# Patient Record
Sex: MALE | Race: WHITE | URBAN - METROPOLITAN AREA
[De-identification: names, ages, dates, MRNs, and addresses within clinical notes are randomized per-mention and may not be internally consistent; named-entity substitution may affect disease eponyms.]

---

## 2021-05-12 ENCOUNTER — OFFICE VISIT (OUTPATIENT)
Dept: PODIATRY | Facility: CLINIC | Age: 46
End: 2021-05-12
Payer: COMMERCIAL

## 2021-05-12 VITALS — HEART RATE: 72 BPM | DIASTOLIC BLOOD PRESSURE: 85 MMHG | WEIGHT: 226 LBS | SYSTOLIC BLOOD PRESSURE: 126 MMHG

## 2021-05-12 DIAGNOSIS — B07.9 VERRUCA: Primary | ICD-10-CM

## 2021-05-12 PROCEDURE — 99243 OFF/OP CNSLTJ NEW/EST LOW 30: CPT | Performed by: PODIATRIST

## 2021-05-12 PROCEDURE — 17110 DESTRUCTION B9 LES UP TO 14: CPT | Performed by: PODIATRIST

## 2021-05-12 RX ORDER — BENAZEPRIL HYDROCHLORIDE AND HYDROCHLOROTHIAZIDE 20; 12.5 MG/1; MG/1
1 TABLET ORAL DAILY
COMMUNITY
Start: 2020-12-01

## 2021-05-12 RX ORDER — ATORVASTATIN CALCIUM 20 MG/1
10 TABLET, FILM COATED ORAL DAILY
COMMUNITY
Start: 2021-01-19

## 2021-05-12 NOTE — PATIENT INSTRUCTIONS
Instructions for Patients Undergoing Catherone Therapy for Verruca (Warts)    It is normal for acid therapy to cause the surrounding skin to get soft and white  It is normal for acid therapy to cause some burning and tenderness after each application  The final phase of the acid therapy is to cause a painful blister  Sometimes this happens after only one application of the Cantherone but more often after 2-4 applications  This is normal   The skin will be whitish and the wart lesion will be swollen and tender and sometime have some drainage  Typically when this happens the wart will be gone  It is not necessary to go the ER unless there is redness all around the lesion or you have a fever  It's not typically necessary to have a culture or start antibiotics  You can stop applying the acid at that point and you make soak the foot in Epsom salts and keep covered with a band-aid in between soaks  You doctor will open the blister at the next visit and the pain will resolve and usually the wart will be noted to be gone

## 2021-05-12 NOTE — PROGRESS NOTES
This patient was seen on 5/12/21  My role is Foot , Ankle, and Wound Specialist    SUBJECTIVE    Chief Complaint:  Painful lesion Left foot     Patient ID: Marty Isbell is a 54 y o  male  Sherry Reese is here with his wife for the first time with a cc of a painful lesion plantar Left foot  He noted the "callous" several weeks ago and attempted to "cut it off" at home  He developed progressive pain, swelling and went to urgent care  He was given Keflex and took it and the pain mostly resolved  Now he notes tenderness and a main lesion plantar Left foot with a small satellite lesion x 1 Left and x 2 Right  He has a teenage daughter who is currently being treated at our practice for warts  The following portions of the patient's history were reviewed and updated as appropriate: allergies, current medications, past family history, past medical history, past social history, past surgical history and problem list     Review of Systems   Constitutional: Negative  Respiratory: Negative  Cardiovascular: Negative  Gastrointestinal: Negative  Musculoskeletal: Positive for gait problem  Skin: Positive for color change  OBJECTIVE      /85   Pulse 72   Wt 103 kg (226 lb)     Foot/Ankle Musculoskeletal Exam    General      Neurological: alert    Neurovascular      Neurovascular - Right        Dorsalis pedis: 2+      Posterior tibial: 2+      Neurovascular - Left        Dorsalis pedis: 2+      Posterior tibial: 2+       Physical Exam  Vitals signs and nursing note reviewed  Constitutional:       General: He is not in acute distress  Appearance: Normal appearance  He is not ill-appearing, toxic-appearing or diaphoretic  Cardiovascular:      Rate and Rhythm: Normal rate  Pulses: Normal pulses  Dorsalis pedis pulses are 2+ on the right side and 2+ on the left side  Posterior tibial pulses are 2+ on the right side and 2+ on the left side     Pulmonary:      Effort: Pulmonary effort is normal    Feet:      Right foot:      Protective Sensation: 10 sites tested  10 sites sensed  Left foot:      Protective Sensation: 10 sites tested  10 sites sensed  Skin:     Comments: I note a 1 0cm lesion plantar Left foot with pinpoint hemorrages and the typical "cauliflower" appearance of verruca  I note a pinhead sized satellite lesion x 1 Left and x 2 Right  Neurological:      Mental Status: He is alert and oriented to person, place, and time  Psychiatric:         Behavior: Behavior normal              ASSESSMENT     Diagnoses and all orders for this visit:    Verruca    Other orders  -     atorvastatin (LIPITOR) 20 mg tablet; Take 10 mg by mouth daily  -     benazepril-hydrochlorthiazide (LOTENSIN HCT) 20-12 5 MG per tablet; Take 1 tablet by mouth daily         Problem List Items Addressed This Visit        Other    Verruca - Primary            Problems:  Plantar verruca    PLAN    These are verruca  We discussed treatment options for the verruca including benign neglect which includes risk of continued spread, OTC salacyclic acid or cryotherapy treatment with low expectation of cure, office-based acid therapy using cantherone with high expectation of cure but requiring 4-5 visits, candida injection which is typically reserved for only recalcitrant cases,  office-based surgery with CO2 laser or scalpel with high expectation of cure but increased pain and risk of scarring, and home remedies including "duct tape" or cimetidine therapy which are not formally recognized therapy and may or may not have benefit  He wants to proceed with cantherone therapy  A formal timeout including patient identification, laterality and existing allergies using [facility] protocol was conducted  The warts were pared with a scalpel down to pinpoint bleeding to remove all keratotic debris  Bleeding was controlled with light pressure   Canterone (1 drop) was applied with a sterile applicator and covered with a small sterile adhesive bandage to each lesion  Instructions were given to leave the dressings intact for 12 hours  After that, the bandages may be removed followed by normal bathing and redressing of the sites with an OTC salacyclic acid patch of choice  I was very clear that it needs to be constantly treated with the sal acid patch until the next visit  Lesion Destruction    Date/Time: 5/12/2021 3:07 PM  Performed by: Shani Gonzalez DPM  Authorized by: Shani Gonzalez DPM   Universal Protocol:  Consent: Verbal consent obtained  Risks and benefits: risks, benefits and alternatives were discussed  Consent given by: patient  Time out: Immediately prior to procedure a "time out" was called to verify the correct patient, procedure, equipment, support staff and site/side marked as required  Timeout called at: 5/12/2021 3:07 PM   Patient understanding: patient states understanding of the procedure being performed  Patient identity confirmed: verbally with patient      Procedure Details - Lesion Destruction:     Number of Lesions:  1  Lesion 1:     Body area:  Lower extremity    Lower extremity location:  L foot    Malignancy: benign lesion      Destruction method: chemical removal          I also discussed that maceration and whiteness of the surrounding intact skin and also possible tenderness / blistering are typical normal findings of the treatment

## 2021-05-12 NOTE — LETTER
May 13, 2021     Sharon Garcia [place] [id number]    Patient: Gilmer Corrigan   YOB: 1975   Date of Visit: 5/12/2021       Dear Dr Murray Floor: Thank you for referring Gilmer Corrigan to me for evaluation  Below are my notes for this consultation  If you have questions, please do not hesitate to call me  I look forward to following your patient along with you  Sincerely,        Emani Lombardi DPM        CC: No Recipients  Jaon Ibanez  5/12/2021  3:08 PM  Signed  This patient was seen on 5/12/21  My role is Foot , Ankle, and Wound Specialist    SUBJECTIVE    Chief Complaint:  Painful lesion Left foot     Patient ID: Gilmer Corrigan is a 54 y o  male  [facility] is here with his wife for the first time with a cc of a painful lesion plantar Left foot  He noted the "callous" several weeks ago and attempted to "cut it off" at home  He developed progressive pain, swelling and went to urgent care  He was given Keflex and took it and the pain mostly resolved  Now he notes tenderness and a main lesion plantar Left foot with a small satellite lesion x 1 Left and x 2 Right  He has a teenage daughter who is currently being treated at our practice for warts  The following portions of the patient's history were reviewed and updated as appropriate: allergies, current medications, past family history, past medical history, past social history, past surgical history and problem list     Review of Systems   Constitutional: Negative  Respiratory: Negative  Cardiovascular: Negative  Gastrointestinal: Negative  Musculoskeletal: Positive for gait problem  Skin: Positive for color change           OBJECTIVE      /85   Pulse 72   Wt 103 kg (226 lb)     Foot/Ankle Musculoskeletal Exam    General      Neurological: alert    Neurovascular      Neurovascular - Right        Dorsalis pedis: 2+      Posterior tibial: 2+      Neurovascular - Left        Dorsalis pedis: 2+      Posterior tibial: 2+       Physical Exam  Vitals signs and nursing note reviewed  Constitutional:       General: He is not in acute distress  Appearance: Normal appearance  He is not ill-appearing, toxic-appearing or diaphoretic  Cardiovascular:      Rate and Rhythm: Normal rate  Pulses: Normal pulses  Dorsalis pedis pulses are 2+ on the right side and 2+ on the left side  Posterior tibial pulses are 2+ on the right side and 2+ on the left side  Pulmonary:      Effort: Pulmonary effort is normal    Feet:      Right foot:      Protective Sensation: 10 sites tested  10 sites sensed  Left foot:      Protective Sensation: 10 sites tested  10 sites sensed  Skin:     Comments: I note a 1 0cm lesion plantar Left foot with pinpoint hemorrages and the typical "cauliflower" appearance of verruca  I note a pinhead sized satellite lesion x 1 Left and x 2 Right  Neurological:      Mental Status: He is alert and oriented to person, place, and time  Psychiatric:         Behavior: Behavior normal              ASSESSMENT     Diagnoses and all orders for this visit:    Verruca    Other orders  -     atorvastatin (LIPITOR) 20 mg tablet; Take 10 mg by mouth daily  -     benazepril-hydrochlorthiazide (LOTENSIN HCT) 20-12 5 MG per tablet; Take 1 tablet by mouth daily         Problem List Items Addressed This Visit        Other    Verruca - Primary            Problems:  Plantar verruca    PLAN    These are verruca      We discussed treatment options for the verruca including benign neglect which includes risk of continued spread, OTC salacyclic acid or cryotherapy treatment with low expectation of cure, office-based acid therapy using cantherone with high expectation of cure but requiring 4-5 visits, candida injection which is typically reserved for only recalcitrant cases,  office-based surgery with CO2 laser or scalpel with high expectation of cure but increased pain and risk of scarring, and home remedies including "duct tape" or cimetidine therapy which are not formally recognized therapy and may or may not have benefit  He wants to proceed with cantherone therapy  A formal timeout including patient identification, laterality and existing allergies using [facility] protocol was conducted  The warts were pared with a scalpel down to pinpoint bleeding to remove all keratotic debris  Bleeding was controlled with light pressure  Canterone (1 drop) was applied with a sterile applicator and covered with a small sterile adhesive bandage to each lesion  Instructions were given to leave the dressings intact for 12 hours  After that, the bandages may be removed followed by normal bathing and redressing of the sites with an OTC salacyclic acid patch of choice  I was very clear that it needs to be constantly treated with the sal acid patch until the next visit  Lesion Destruction    Date/Time: 5/12/2021 3:07 PM  Performed by: Rodriguez Rodriguez DPM  Authorized by: Rodriguez Rodriguez DPM   Universal Protocol:  Consent: Verbal consent obtained  Risks and benefits: risks, benefits and alternatives were discussed  Consent given by: patient  Time out: Immediately prior to procedure a "time out" was called to verify the correct patient, procedure, equipment, support staff and site/side marked as required  Timeout called at: 5/12/2021 3:07 PM   Patient understanding: patient states understanding of the procedure being performed  Patient identity confirmed: verbally with patient      Procedure Details - Lesion Destruction:     Number of Lesions:  1  Lesion 1:     Body area:  Lower extremity    Lower extremity location:  L foot    Malignancy: benign lesion      Destruction method: chemical removal          I also discussed that maceration and whiteness of the surrounding intact skin and also possible tenderness / blistering are typical normal findings of the treatment

## 2021-05-19 ENCOUNTER — OFFICE VISIT (OUTPATIENT)
Dept: PODIATRY | Facility: CLINIC | Age: 46
End: 2021-05-19
Payer: COMMERCIAL

## 2021-05-19 VITALS
SYSTOLIC BLOOD PRESSURE: 131 MMHG | HEIGHT: 72 IN | DIASTOLIC BLOOD PRESSURE: 92 MMHG | BODY MASS INDEX: 30.1 KG/M2 | WEIGHT: 222.2 LBS | HEART RATE: 80 BPM

## 2021-05-19 DIAGNOSIS — B07.9 VERRUCA: Primary | ICD-10-CM

## 2021-05-19 PROCEDURE — 17110 DESTRUCTION B9 LES UP TO 14: CPT | Performed by: PODIATRIST

## 2021-05-19 PROCEDURE — 99213 OFFICE O/P EST LOW 20 MIN: CPT | Performed by: PODIATRIST

## 2021-05-20 NOTE — PROGRESS NOTES
Date Patient Seen: 5/19/21       Subjective:     Chief Complaint:  Verruca     Patient ID: Donovan Hernandez is a 54 y o  male  Curt Ricketts is here after cantherone therapy for verruca  He was somewhat non-compliant with home application of salacyclic acids to his lesions  He did note quite a bit of pain on the Left foot after the cantherone treatment  The following portions of the patient's history were reviewed and updated as appropriate: allergies, current medications, past family history, past medical history, past social history, past surgical history and problem list     Review of Systems   Constitutional: Negative  Respiratory: Negative  Cardiovascular: Negative  Gastrointestinal: Negative  Musculoskeletal: Positive for gait problem  Skin: Positive for color change  Objective:      /92   Pulse 80   Ht 6' (1 829 m) Comment: verbal  Wt 101 kg (222 lb 3 2 oz)   BMI 30 14 kg/m²        Physical Exam  Vitals signs and nursing note reviewed  Constitutional:       General: He is not in acute distress  Appearance: Normal appearance  He is not ill-appearing, toxic-appearing or diaphoretic  Cardiovascular:      Rate and Rhythm: Normal rate  Pulses: Normal pulses  Dorsalis pedis pulses are 2+ on the right side and 2+ on the left side  Posterior tibial pulses are 2+ on the right side and 2+ on the left side  Pulmonary:      Effort: Pulmonary effort is normal    Feet:      Right foot:      Protective Sensation: 10 sites tested  10 sites sensed  Left foot:      Protective Sensation: 10 sites tested  10 sites sensed  Skin:     Comments: I note a 1 0cm lesion plantar Left foot with pinpoint hemorrages and the typical "cauliflower" appearance of verruca  I note a pinhead sized satellite lesion x 1 Left and x 2 Right  The Left foot lesion has some blistering around it typical of cantherone treatment     Neurological:      Mental Status: He is alert and oriented to person, place, and time  Psychiatric:         Behavior: Behavior normal             Diagnoses and all orders for this visit:    Verruca         Assessment:  Verruca    Plan:  I pared down the macerated wart tissue with scalpel  About 80% of it is resolved  I feel that reapplication of cantherone will be too painful  I recommended he continue daily application of salacyclic OTC to all 4 lesions x 14 days and return here for re-evaluation in 30 days  Lesion Destruction    Date/Time: 5/19/2021 8:12 AM  Performed by: Matt Brooke DPM  Authorized by: Matt Brooke DPM   Universal Protocol:  Consent: Verbal consent obtained  Risks and benefits: risks, benefits and alternatives were discussed  Consent given by: patient  Time out: Immediately prior to procedure a "time out" was called to verify the correct patient, procedure, equipment, support staff and site/side marked as required    Timeout called at: 5/19/2021 8:12 AM   Patient understanding: patient states understanding of the procedure being performed  Patient identity confirmed: verbally with patient      Procedure Details - Lesion Destruction:     Number of Lesions:  4  Lesion 1:     Body area:  Lower extremity    Lower extremity location:  L foot    Malignancy: benign lesion      Destruction method: chemical removal    Lesion 2:     Body area:  Lower extremity    Lower extremity location:  L foot    Malignancy: benign lesion      Destruction method: chemical removal    Lesion 3:     Body area:  Lower extremity    Lower extremity location:  R foot    Destruction method: chemical removal    Lesion 4:     Body area:  Lower extremity    Lower extremity location:  R foot    Malignancy: benign lesion      Destruction method: chemical removal

## 2023-01-21 ENCOUNTER — HOSPITAL ENCOUNTER (EMERGENCY)
Facility: HOSPITAL | Age: 48
Discharge: HOME | End: 2023-01-21
Attending: EMERGENCY MEDICINE

## 2023-01-21 VITALS
DIASTOLIC BLOOD PRESSURE: 99 MMHG | SYSTOLIC BLOOD PRESSURE: 145 MMHG | RESPIRATION RATE: 14 BRPM | TEMPERATURE: 98.9 F | HEART RATE: 68 BPM | OXYGEN SATURATION: 98 %

## 2023-01-21 DIAGNOSIS — R10.11 RIGHT UPPER QUADRANT ABDOMINAL PAIN: Primary | ICD-10-CM

## 2023-01-21 DIAGNOSIS — R74.01 ELEVATED ALT MEASUREMENT: ICD-10-CM

## 2023-01-21 DIAGNOSIS — Z87.19 HISTORY OF DIVERTICULITIS: ICD-10-CM

## 2023-01-21 LAB
ALBUMIN SERPL BCP-MCNC: 4.1 G/DL (ref 3.5–5)
ALP SERPL-CCNC: 73 U/L (ref 46–116)
ALT SERPL W P-5'-P-CCNC: 109 U/L (ref 12–78)
ANION GAP SERPL CALCULATED.3IONS-SCNC: 5 MMOL/L (ref 4–13)
AST SERPL W P-5'-P-CCNC: 43 U/L (ref 5–45)
ATRIAL RATE: 66 BPM
BASOPHILS # BLD AUTO: 0.04 THOUSANDS/ÂΜL (ref 0–0.1)
BASOPHILS NFR BLD AUTO: 1 % (ref 0–1)
BILIRUB SERPL-MCNC: 0.38 MG/DL (ref 0.2–1)
BUN SERPL-MCNC: 17 MG/DL (ref 5–25)
CALCIUM SERPL-MCNC: 9.3 MG/DL (ref 8.3–10.1)
CHLORIDE SERPL-SCNC: 107 MMOL/L (ref 96–108)
CO2 SERPL-SCNC: 27 MMOL/L (ref 21–32)
CREAT SERPL-MCNC: 0.91 MG/DL (ref 0.6–1.3)
EOSINOPHIL # BLD AUTO: 0.09 THOUSAND/ÂΜL (ref 0–0.61)
EOSINOPHIL NFR BLD AUTO: 1 % (ref 0–6)
ERYTHROCYTE [DISTWIDTH] IN BLOOD BY AUTOMATED COUNT: 11.9 % (ref 11.6–15.1)
GFR SERPL CREATININE-BSD FRML MDRD: 99 ML/MIN/1.73SQ M
GLUCOSE SERPL-MCNC: 97 MG/DL (ref 65–140)
HCT VFR BLD AUTO: 43.1 % (ref 36.5–49.3)
HGB BLD-MCNC: 14.5 G/DL (ref 12–17)
IMM GRANULOCYTES # BLD AUTO: 0.05 THOUSAND/UL (ref 0–0.2)
IMM GRANULOCYTES NFR BLD AUTO: 1 % (ref 0–2)
LIPASE SERPL-CCNC: 245 U/L (ref 73–393)
LYMPHOCYTES # BLD AUTO: 3.29 THOUSANDS/ÂΜL (ref 0.6–4.47)
LYMPHOCYTES NFR BLD AUTO: 40 % (ref 14–44)
MCH RBC QN AUTO: 29.1 PG (ref 26.8–34.3)
MCHC RBC AUTO-ENTMCNC: 33.6 G/DL (ref 31.4–37.4)
MCV RBC AUTO: 87 FL (ref 82–98)
MONOCYTES # BLD AUTO: 0.53 THOUSAND/ÂΜL (ref 0.17–1.22)
MONOCYTES NFR BLD AUTO: 7 % (ref 4–12)
NEUTROPHILS # BLD AUTO: 4.17 THOUSANDS/ÂΜL (ref 1.85–7.62)
NEUTS SEG NFR BLD AUTO: 50 % (ref 43–75)
NRBC BLD AUTO-RTO: 0 /100 WBCS
P AXIS: 67 DEGREES
PLATELET # BLD AUTO: 279 THOUSANDS/UL (ref 149–390)
PMV BLD AUTO: 9.6 FL (ref 8.9–12.7)
POTASSIUM SERPL-SCNC: 4.3 MMOL/L (ref 3.5–5.3)
PR INTERVAL: 170 MS
PROT SERPL-MCNC: 7.7 G/DL (ref 6.4–8.4)
QRS AXIS: 9 DEGREES
QRSD INTERVAL: 76 MS
QT INTERVAL: 392 MS
QTC INTERVAL: 410 MS
RBC # BLD AUTO: 4.98 MILLION/UL (ref 3.88–5.62)
SODIUM SERPL-SCNC: 139 MMOL/L (ref 135–147)
T WAVE AXIS: 13 DEGREES
VENTRICULAR RATE: 66 BPM
WBC # BLD AUTO: 8.17 THOUSAND/UL (ref 4.31–10.16)

## 2023-01-21 NOTE — DISCHARGE INSTRUCTIONS
Follow up with your PCP as needed  As we discussed, if your pain recurs please come back to the emergency department

## 2023-01-21 NOTE — ED PROVIDER NOTES
History  Chief Complaint   Patient presents with   • Abdominal Pain     Right sided abd pain causing me to get dizzy and feel flushed  Denies nausea or vomiting or SOB  No diarrhea  "I was out to breakfast and I feel pressure so I dont know what it could be, I have diverticulitis but that's normally on the left side but it feels like the same kind of pain"       History provided by:  Patient  Abdominal Pain  Associated symptoms: no chest pain, no chills, no cough, no dysuria, no fever, no hematuria, no shortness of breath, no sore throat and no vomiting         Patient is a 50 y/o M with PMH HTN, HLD, diverticulitis presenting with acute onset right sided abdominal pain  Was out to eat this morning for breakfast and started feeling RUQ pressure, feels similar to prior episodes of diverticulitis but usually that pain is on the left side  Pain was constant at start but now intermittent  Felt some dizziness, flushed  No associated nausea, vomiting, diarrhea, CP, SOB, fever, back pain  Last colonoscopy 3 years ago  Prior to Admission Medications   Prescriptions Last Dose Informant Patient Reported? Taking?   atorvastatin (LIPITOR) 20 mg tablet   Yes No   Sig: Take 10 mg by mouth daily   benazepril-hydrochlorthiazide (LOTENSIN HCT) 20-12 5 MG per tablet   Yes No   Sig: Take 1 tablet by mouth daily      Facility-Administered Medications: None       Past Medical History:   Diagnosis Date   • Diverticulitis    • Hyperlipidemia    • Hypertension        Past Surgical History:   Procedure Laterality Date   • WISDOM TOOTH EXTRACTION      6 years ag0       History reviewed  No pertinent family history  I have reviewed and agree with the history as documented      E-Cigarette/Vaping   • E-Cigarette Use Never User      E-Cigarette/Vaping Substances   • Nicotine No    • THC No    • CBD No    • Flavoring No    • Other No    • Unknown No      Social History     Tobacco Use   • Smoking status: Never   • Smokeless tobacco: Never Vaping Use   • Vaping Use: Never used        Review of Systems   Constitutional: Negative for chills and fever  HENT: Negative for ear pain and sore throat  Eyes: Negative for pain and visual disturbance  Respiratory: Negative for cough and shortness of breath  Cardiovascular: Negative for chest pain and palpitations  Gastrointestinal: Positive for abdominal pain  Negative for vomiting  Genitourinary: Negative for dysuria and hematuria  Musculoskeletal: Negative for arthralgias and back pain  Skin: Negative for color change and rash  Neurological: Negative for seizures and syncope  All other systems reviewed and are negative  Physical Exam  ED Triage Vitals [01/21/23 1108]   Temperature Pulse Respirations Blood Pressure SpO2   98 9 °F (37 2 °C) 72 18 (!) 164/119 100 %      Temp Source Heart Rate Source Patient Position - Orthostatic VS BP Location FiO2 (%)   Tympanic Monitor Sitting Left arm --      Pain Score       8             Orthostatic Vital Signs  Vitals:    01/21/23 1108 01/21/23 1215   BP: (!) 164/119 145/99   Pulse: 72 68   Patient Position - Orthostatic VS: Sitting        Physical Exam  Vitals and nursing note reviewed  Constitutional:       General: He is not in acute distress  Appearance: He is well-developed  He is not toxic-appearing  HENT:      Head: Normocephalic and atraumatic  Right Ear: External ear normal       Left Ear: External ear normal       Nose: Nose normal       Mouth/Throat:      Pharynx: Oropharynx is clear  No oropharyngeal exudate or posterior oropharyngeal erythema  Eyes:      Extraocular Movements: Extraocular movements intact  Conjunctiva/sclera: Conjunctivae normal       Pupils: Pupils are equal, round, and reactive to light  Cardiovascular:      Rate and Rhythm: Normal rate and regular rhythm  Pulses: Normal pulses  Heart sounds: Normal heart sounds  No murmur heard  No friction rub  No gallop     Pulmonary: Effort: Pulmonary effort is normal  No respiratory distress  Breath sounds: Normal breath sounds  No wheezing, rhonchi or rales  Abdominal:      General: Abdomen is flat  Bowel sounds are normal       Palpations: Abdomen is soft  Tenderness: There is no abdominal tenderness  There is no right CVA tenderness, left CVA tenderness, guarding or rebound  Musculoskeletal:         General: Normal range of motion  Cervical back: Normal range of motion  No rigidity  Right lower leg: No edema  Left lower leg: No edema  Skin:     General: Skin is warm and dry  Capillary Refill: Capillary refill takes less than 2 seconds  Neurological:      General: No focal deficit present  Mental Status: He is alert     Psychiatric:         Mood and Affect: Mood normal          ED Medications  Medications - No data to display    Diagnostic Studies  Results Reviewed     Procedure Component Value Units Date/Time    Comprehensive metabolic panel [[id number]]  (Abnormal) Collected: 01/21/23 1208    Lab Status: Final result Specimen: Blood from Arm, Right Updated: 01/21/23 1242     Sodium 139 mmol/L      Potassium 4 3 mmol/L      Chloride 107 mmol/L      CO2 27 mmol/L      ANION GAP 5 mmol/L      BUN 17 mg/dL      Creatinine 0 91 mg/dL      Glucose 97 mg/dL      Calcium 9 3 mg/dL      AST 43 U/L       U/L      Alkaline Phosphatase 73 U/L      Total Protein 7 7 g/dL      Albumin 4 1 g/dL      Total Bilirubin 0 38 mg/dL      eGFR 99 ml/min/1 73sq m     Narrative:      [place] guidelines for Chronic Kidney Disease (CKD):   •  Stage 1 with normal or high GFR (GFR > 90 mL/min/1 73 square meters)  •  Stage 2 Mild CKD (GFR = 60-89 mL/min/1 73 square meters)  •  Stage 3A Moderate CKD (GFR = 45-59 mL/min/1 73 square meters)  •  Stage 3B Moderate CKD (GFR = 30-44 mL/min/1 73 square meters)  •  Stage 4 Severe CKD (GFR = 15-29 mL/min/1 73 square meters)  •  Stage 5 End Stage CKD (GFR <15 mL/min/1 73 square meters)  Note: GFR calculation is accurate only with a steady state creatinine    Lipase [[id number]]  (Normal) Collected: 01/21/23 1208    Lab Status: Final result Specimen: Blood from Arm, Right Updated: 01/21/23 1242     Lipase 245 u/L     CBC and differential [[id number]] Collected: 01/21/23 1208    Lab Status: Final result Specimen: Blood from Arm, Right Updated: 01/21/23 1222     WBC 8 17 Thousand/uL      RBC 4 98 Million/uL      Hemoglobin 14 5 g/dL      Hematocrit 43 1 %      MCV 87 fL      MCH 29 1 pg      MCHC 33 6 g/dL      RDW 11 9 %      MPV 9 6 fL      Platelets 469 Thousands/uL      nRBC 0 /100 WBCs      Neutrophils Relative 50 %      Immat GRANS % 1 %      Lymphocytes Relative 40 %      Monocytes Relative 7 %      Eosinophils Relative 1 %      Basophils Relative 1 %      Neutrophils Absolute 4 17 Thousands/µL      Immature Grans Absolute 0 05 Thousand/uL      Lymphocytes Absolute 3 29 Thousands/µL      Monocytes Absolute 0 53 Thousand/µL      Eosinophils Absolute 0 09 Thousand/µL      Basophils Absolute 0 04 Thousands/µL                  No orders to display         Procedures  POC Biliary US    Date/Time: 1/21/2023 12:02 PM  Performed by: Lew Barnhart MD  Authorized by: Lew Barnhart MD     Patient location:  ED  Performed by:  Resident  Other Assisting Provider: Yes (comment) Carolina Mckee)    Procedure details:     Exam Type:  Educational    Indications: upper right quadrant abdominal pain      Assessment for:  Cholelithiasis    Views obtained: gallbladder (transverse and longitudinal) and portal triad      Image quality: non-diagnostic      Image availability:  Images available in PACS  Findings:     Cholelithiasis: not identified      Common bile duct:  Unable to visualize    Gallbladder wall:  Normal    Pericholecystic fluid: not identified      Sonographic Sotelo's sign: negative      Polyps: not identified      Mass: not identified    Interpretation:     Biliary ultrasound impressions: indeterminate    POC AAA US    Date/Time: 1/21/2023 12:03 PM  Performed by: Lew Barnhart MD  Authorized by: Lew Barnhart MD     Patient location:  ED  Performing Provider:  Resident  Other Assisting Provider: Yes (comment) Carolina Mckee)    Procedure details:     Exam Type:  Educational    Indications: abdominal pain      Views Obtained:  Transverse proximal, transverse mid view, transverse distal view and sagittal (longitudinal) view    Image quality: limited diagnostic      Image availability:  Images available in PACS  Findings:     Abdominal Aorta Findings: normal      Maximal Aorta diameter (cm):  2 5    Intra-abdominal fluid: not identified    Interpretation:     Aortic ultrasound impression: aorta normal            ED Course  ED Course as of 01/21/23 1317   Sat Jan 21, 2023   1216 ECG 12 lead  Procedure Note: EKG  Date/Time: 01/21/23 12:16 PM   Interpreted by: Cecy Mathew MD  Indications / Diagnosis: abdominal pain  ECG reviewed by me, the ED Provider: yes   The EKG demonstrates:  Rhythm: normal sinus  Intervals: normal intervals  Axis: normal axis  QRS/Blocks: normal QRS  ST Changes: No acute ST Changes, no STD/PATTIE      1229 Hemoglobin: 14 5                             SBIRT 20yo+    Flowsheet Row Most Recent Value   SBIRT (25 yo +)    In order to provide better care to our patients, we are screening all of our patients for alcohol and drug use  Would it be okay to ask you these screening questions? No Filed at: 01/21/2023 1212                Medical Decision Making  48 y/o M with PMH diverticulitis presenting with acute RUQ abdominal pain  VSS  Exam non-tender  Ddx broad includes diverticulitis, appendicitis, nephrolithiasis,  cholecystitis/biliary colic, hepatitis, pancreatitis, AAA  Will assess with cbc, cmp, lipase  Bedside ultrasound did not reveal cholelithiasis, normal aorta  Pt feeling better without intervention  D/w pt will check basic labs and EKG   If pain recurs while in ED will pursue CT scan  Otherwise plan is for discharge with return precautions  This was discussed with pt and wife at bedside and they agree with this plan  Labs notable only for slight elevation in ALT which pt was already aware of and states he has outpatient liver ultrasound pending  Based on prior conversation with pt, will discharge at this time as pain continues to have subsided and pt appears otherwise clinically stable  Pt advised to return to ED for any worsening of symptoms  History of diverticulitis: chronic illness or injury  Right upper quadrant abdominal pain: acute illness or injury  Amount and/or Complexity of Data Reviewed  Labs: ordered  ECG/medicine tests: independent interpretation performed  Decision-making details documented in ED Course  Disposition  Final diagnoses:   Right upper quadrant abdominal pain   History of diverticulitis   Elevated ALT measurement     Time reflects when diagnosis was documented in both MDM as applicable and the Disposition within this note     Time User Action Codes Description Comment    1/21/2023 12:47 PM Flor Shad Add [R10 11] Right upper quadrant abdominal pain     1/21/2023 12:47 PM Flor Shad Add [Z87 19] History of diverticulitis     1/21/2023 12:54 PM Flor Shad Add [R74 01] Elevated ALT measurement       ED Disposition     ED Disposition   Discharge    Condition   Stable    Date/Time   Sat Jan 21, 2023 12:54 PM    Comment   Talitha Blizzard discharge to home/self care                 Follow-up Information     Follow up With Specialties Details Why Contact Info Additional [address] [facility], MD Family Medicine   [address] [place] [address]  [phone]       [facility] Emergency Medicine  If symptoms worsen [address] [id number]  [address] [facility], [address], [place], [address] [facility] [address]   [phone]          Discharge Medication List as of 1/21/2023 12:57 PM      CONTINUE these medications which have NOT CHANGED    Details   atorvastatin (LIPITOR) 20 mg tablet Take 10 mg by mouth daily, Starting Tue 1/19/2021, Historical Med      benazepril-hydrochlorthiazide (LOTENSIN HCT) 20-12 5 MG per tablet Take 1 tablet by mouth daily, Starting Tue 12/1/2020, Historical Med           No discharge procedures on file  PDMP Review     None           ED Provider  Attending physically available and evaluated Wally Springer I managed the patient along with the ED Attending      Electronically Signed by         Beth Pedroza MD  01/21/23 7284

## 2023-01-22 NOTE — ED ATTENDING ATTESTATION
1/21/2023  IBlair MD, saw and evaluated the patient  I have discussed the patient with the resident/non-physician practitioner and agree with the resident's/non-physician practitioner's findings, Plan of Care, and MDM as documented in the resident's/non-physician practitioner's note, except where noted  All available labs and Radiology studies were reviewed  I was present for key portions of any procedure(s) performed by the resident/non-physician practitioner and I was immediately available to provide assistance  At this point I agree with the current assessment done in the Emergency Department  I have conducted an independent evaluation of this patient a history and physical is as follows:    History Source: Patient  HPI: Patient is a 43-year-old male who presents with cute onset right-sided upper abdominal pain patient states he was at a diner getting breakfast with his significant other when he developed abrupt onset right upper quadrant abdominal pain associate symptoms included dizziness feeling diaphoretic and flushed  Denies any nausea vomiting or diarrhea  Denies shortness of breath  Patient described abdominal discomfort as pressure-like in nature  Patient has a history of left-sided/sigmoid diverticulitis and felt that symptoms were consistent with prior episodes of diverticulitis  Upon arrival to the emergency department, patient states his symptoms resolved  PE:    Vitals reviewed  General:  No acute distress  Heart regular rate and rhythm without murmurs rubs or gallops  Lungs clear to auscultation bilaterally  Abdomen soft nontender nondistended normal bowel sounds  No signs of peritonitis   Extremities:  normal pulses, no edema no tenderness  Neuro: Grossly intact  Impression/Plan: Abdominal pain resolved       MDM:The patient presents with right upper quadrant abdominal pain symptoms have now resolved associate symptoms included dizziness    Considered biliary colic, cholecystitis,  unlikely pancreatitis, colitis, cholecystitis, appendicitis, pyelonephritis, renal/ureteral colic as symptoms have resolved patient has a reassuring abdominal examination and has no gastrointestinal symptoms such as nausea vomiting or diarrhea  Patiently patient has no urinary symptoms suggestive of renal ureteral colic pyelonephritis UTI  Plan: Plan to check ECG, bedside biliary AAA ultrasound CMP  lipase CBC  Reassess continue to monitor patient  ED Course   Labs reviewed: CMP remarkable for mild elevation in ALT per patient and wife bedside he has had elevations in his ALT and the past currently being evaluated by his PCP for same  CBC unremarkable  Lipase unremarkable  Patient underwent point-of-care biliary ultrasound by resident as well as AAA ultrasound  AAA ultrasound within normal limits  CBD not identified on biliary ultrasound however patient had normal-appearing gallbladder with no gallstones or sonographic Sotelo sign less suspicious for acute biliary pathology  ECG reviewed by me: Normal sinus rhythm normal axis normal intervals normal QRS no acute ST-T changes impression normal ECG  Discussion with patient and significant other bedside given patient's reassuring abdominal examination normal lab values and no recurrence of symptoms I do not believe that CT imaging would be diagnostic at this time  Through shared decision-making, patient, wife and I have agreed to home observation with strict return precautions to return emergency department should symptoms recur or patient develop any new symptoms in the next 12 to 24 hours  Both patient and wife verbalized understanding of all return precautions follow-up instructions  Patient was stable for discharge            Critical Care Time  Procedures

## 2025-01-21 ENCOUNTER — OFFICE VISIT (OUTPATIENT)
Dept: CARDIOLOGY CLINIC | Facility: CLINIC | Age: 50
End: 2025-01-21
Payer: COMMERCIAL

## 2025-01-21 VITALS
HEART RATE: 77 BPM | SYSTOLIC BLOOD PRESSURE: 132 MMHG | HEIGHT: 72 IN | DIASTOLIC BLOOD PRESSURE: 90 MMHG | BODY MASS INDEX: 33.13 KG/M2 | WEIGHT: 244.6 LBS

## 2025-01-21 DIAGNOSIS — R06.83 SNORING: ICD-10-CM

## 2025-01-21 DIAGNOSIS — E78.5 DYSLIPIDEMIA: ICD-10-CM

## 2025-01-21 DIAGNOSIS — R40.0 DAYTIME SOMNOLENCE: ICD-10-CM

## 2025-01-21 DIAGNOSIS — I10 HYPERTENSION, UNSPECIFIED TYPE: Primary | ICD-10-CM

## 2025-01-21 PROCEDURE — 93000 ELECTROCARDIOGRAM COMPLETE: CPT | Performed by: INTERNAL MEDICINE

## 2025-01-21 PROCEDURE — 99204 OFFICE O/P NEW MOD 45 MIN: CPT | Performed by: INTERNAL MEDICINE

## 2025-01-21 RX ORDER — AMLODIPINE BESYLATE 5 MG/1
5 TABLET ORAL DAILY
Qty: 90 TABLET | Refills: 3 | Status: SHIPPED | OUTPATIENT
Start: 2025-01-21

## 2025-01-21 RX ORDER — AMLODIPINE BESYLATE 5 MG/1
1 TABLET ORAL DAILY
COMMUNITY
Start: 2024-12-17 | End: 2025-01-21 | Stop reason: SDUPTHER

## 2025-01-21 RX ORDER — ATORVASTATIN CALCIUM 20 MG/1
10 TABLET, FILM COATED ORAL DAILY
Qty: 45 TABLET | Refills: 3 | Status: SHIPPED | OUTPATIENT
Start: 2025-01-21

## 2025-01-21 RX ORDER — BENAZEPRIL HYDROCHLORIDE 20 MG/1
20 TABLET ORAL DAILY
Qty: 90 TABLET | Refills: 3 | Status: SHIPPED | OUTPATIENT
Start: 2025-01-21

## 2025-01-21 NOTE — PROGRESS NOTES
Cardiology             Kareem Oneill  1975  [id number]              Assessment/Plan:    Hypertension  Dyslipidemia  Obesity, BMI 33      I long discussion with this patient and his wife about the pathophysiology of hypertension and potential complications as well as management strategies.  I have strongly advocated for more aggressive lifestyle modification as he does not exercise and does eat out about 3 times a week.  I have encouraged him to eat a low-sodium diet and incorporate more green leafy vegetables in his diet.  Additionally I have strongly recommended that he start exercising regularly to try to lose weight and pursue some calorie restriction for the same reason.  Overall home blood pressure readings are reasonable.  Have given him permission to discontinue HCTZ as he does complain of subjective orthostasis which bothers him quite a bit.  I have encouraged him to drink more water and stay well-hydrated especially on days when he does exercise.  If his blood pressure starts to creep up off HCTZ, he will restart this medication and pursue more aggressive hydration.  Heartedly diet, exercise, weight loss strongly recommended for primary prevention of ASCVD.  Strongly recommended sleep study  May consider calcium scoring after next visit for further restratification        Follow-up in 4 months        Interval History:     This is a 49-year-old male with hypertension and dyslipidemia who presents today for evaluation.    He does not exercise, and admits to some progressive weight gain over the past few years.  He drinks alcohol infrequently.  He eats out at diner/restaurants/fast foods about 3 times a week.  He does not take any nonsteroidal anti-inflammatory medications.  He states home blood pressure readings are typically in the 120s to 130s.  He admits to loud snoring and daytime hypersomnolence typically later in the day.    From a symptomatic standpoint he feels well without chest pain,  shortness of breath, palpitations, lower extreme edema.  He admits to subjective orthostasis when he bends over to pick something up or look inside a bottom cabinet.  He had 1 episode of presyncope last year.             Social History:    Non-smoker          Vitals:  Vitals:    01/21/25 1050   BP: 132/90   Patient Position: Sitting   Cuff Size: Large   Pulse: 77   Weight: 111 kg (244 lb 9.6 oz)   Height: 6' (1.829 m)         Past Medical History:   Diagnosis Date   • Diverticulitis    • Hyperlipidemia    • Hypertension      Social History     Socioeconomic History   • Marital status: /Civil Union     Spouse name: Not on file   • Number of children: Not on file   • Years of education: Not on file   • Highest education level: Not on file   Occupational History   • Not on file   Tobacco Use   • Smoking status: Never   • Smokeless tobacco: Never   Vaping Use   • Vaping status: Never Used   Substance and Sexual Activity   • Alcohol use: Not on file   • Drug use: Not on file   • Sexual activity: Not on file   Other Topics Concern   • Not on file   Social History Narrative   • Not on file     Social Drivers of Health     Financial Resource Strain: Not on file   Food Insecurity: Not on file   Transportation Needs: Not on file   Physical Activity: Not on file   Stress: Not on file   Social Connections: Not on file   Intimate Partner Violence: Not on file   Housing Stability: Not on file      No family history on file.  Past Surgical History:   Procedure Laterality Date   • WISDOM TOOTH EXTRACTION      6 years ag0       Current Outpatient Medications:   •  amLODIPine (NORVASC) 5 mg tablet, Take 1 tablet (5 mg total) by mouth in the morning, Disp: 90 tablet, Rfl: 3  •  atorvastatin (LIPITOR) 20 mg tablet, Take 0.5 tablets (10 mg total) by mouth daily, Disp: 45 tablet, Rfl: 3  •  benazepril (LOTENSIN) 20 mg tablet, Take 1 tablet (20 mg total) by mouth daily, Disp: 90 tablet, Rfl: 3        Review of Systems:  Review of  Systems   Respiratory: Negative.     Cardiovascular: Negative.    Neurological:  Positive for light-headedness.   All other systems reviewed and are negative.        Physical Exam:  Physical Exam  Constitutional:       General: He is not in acute distress.     Appearance: He is well-developed. He is obese. He is not diaphoretic.   HENT:      Head: Normocephalic and atraumatic.   Eyes:      General: No scleral icterus.        Right eye: No discharge.      Pupils: Pupils are equal, round, and reactive to light.   Neck:      Thyroid: No thyromegaly.   Cardiovascular:      Rate and Rhythm: Normal rate and regular rhythm.      Heart sounds: Normal heart sounds. No murmur heard.     No friction rub. No gallop.   Pulmonary:      Effort: Pulmonary effort is normal.      Breath sounds: Normal breath sounds.   Abdominal:      General: There is no distension.      Tenderness: There is no abdominal tenderness. There is no guarding or rebound.   Musculoskeletal:         General: Normal range of motion.      Cervical back: Normal range of motion and neck supple.   Skin:     General: Skin is warm and dry.      Coloration: Skin is not pale.      Findings: No erythema or rash.   Neurological:      Mental Status: He is alert and oriented to person, place, and time.      Coordination: Coordination normal.      Gait: Gait abnormal.   Psychiatric:         Behavior: Behavior normal.         Thought Content: Thought content normal.         Judgment: Judgment normal.         This note was completed in part utilizing M-Modal Fluency Direct Software.  Grammatical errors, random word insertions, spelling mistakes, and incomplete sentences can be an occasional consequence of this system secondary to software limitations, ambient noise, and hardware issues.  If you have any questions or concerns about the content, text, or information contained within the body of this dictation, please contact the provider for clarification.

## 2025-02-23 ENCOUNTER — OFFICE VISIT (OUTPATIENT)
Dept: URGENT CARE | Age: 50
End: 2025-02-23
Payer: COMMERCIAL

## 2025-02-23 ENCOUNTER — APPOINTMENT (OUTPATIENT)
Dept: URGENT CARE | Age: 50
End: 2025-02-23
Payer: COMMERCIAL

## 2025-02-23 VITALS
TEMPERATURE: 98.1 F | HEART RATE: 110 BPM | RESPIRATION RATE: 18 BRPM | SYSTOLIC BLOOD PRESSURE: 148 MMHG | DIASTOLIC BLOOD PRESSURE: 108 MMHG | OXYGEN SATURATION: 98 %

## 2025-02-23 DIAGNOSIS — Z20.822 ENCOUNTER FOR LABORATORY TESTING FOR COVID-19 VIRUS: ICD-10-CM

## 2025-02-23 DIAGNOSIS — J01.00 ACUTE MAXILLARY SINUSITIS, RECURRENCE NOT SPECIFIED: ICD-10-CM

## 2025-02-23 DIAGNOSIS — J40 BRONCHITIS: Primary | ICD-10-CM

## 2025-02-23 DIAGNOSIS — I10 ESSENTIAL HYPERTENSION: ICD-10-CM

## 2025-02-23 LAB
SARS-COV-2 AG UPPER RESP QL IA: NEGATIVE
VALID CONTROL: NORMAL

## 2025-02-23 PROCEDURE — S9083 URGENT CARE CENTER GLOBAL: HCPCS | Performed by: PHYSICIAN ASSISTANT

## 2025-02-23 PROCEDURE — G0382 LEV 3 HOSP TYPE B ED VISIT: HCPCS | Performed by: PHYSICIAN ASSISTANT

## 2025-02-23 PROCEDURE — 87811 SARS-COV-2 COVID19 W/OPTIC: CPT | Performed by: PHYSICIAN ASSISTANT

## 2025-02-23 RX ORDER — BENZONATATE 100 MG/1
100 CAPSULE ORAL 3 TIMES DAILY PRN
Qty: 20 CAPSULE | Refills: 0 | Status: SHIPPED | OUTPATIENT
Start: 2025-02-23

## 2025-02-23 RX ORDER — AZITHROMYCIN 250 MG/1
TABLET, FILM COATED ORAL
Qty: 6 TABLET | Refills: 0 | Status: SHIPPED | OUTPATIENT
Start: 2025-02-23 | End: 2025-02-27

## 2025-02-23 RX ORDER — AZITHROMYCIN 250 MG/1
TABLET, FILM COATED ORAL
Qty: 6 TABLET | Refills: 0 | Status: SHIPPED | OUTPATIENT
Start: 2025-02-23 | End: 2025-02-23

## 2025-02-23 RX ORDER — FLUTICASONE PROPIONATE 50 MCG
1 SPRAY, SUSPENSION (ML) NASAL DAILY
Qty: 9.9 ML | Refills: 0 | Status: SHIPPED | OUTPATIENT
Start: 2025-02-23 | End: 2025-02-23

## 2025-02-23 RX ORDER — FLUTICASONE PROPIONATE 50 MCG
1 SPRAY, SUSPENSION (ML) NASAL DAILY
Qty: 9.9 ML | Refills: 0 | Status: SHIPPED | OUTPATIENT
Start: 2025-02-23

## 2025-02-23 RX ORDER — BENZONATATE 100 MG/1
100 CAPSULE ORAL 3 TIMES DAILY PRN
Qty: 20 CAPSULE | Refills: 0 | Status: SHIPPED | OUTPATIENT
Start: 2025-02-23 | End: 2025-02-23

## 2025-02-23 NOTE — PROGRESS NOTES
[facility]        NAME: Kareem Oneill is a 49 y.o. male  : 1975    MRN: [id number]  DATE: 2025  TIME: 1:09 PM    BP (!) 148/108   Pulse (!) 110   Temp 98.1 °F (36.7 °C)   Resp 18   SpO2 98%     Assessment and Plan   Bronchitis [J40]  1. Bronchitis  Poct Covid 19 Rapid Antigen Test    azithromycin (ZITHROMAX) 250 mg tablet    benzonatate (TESSALON PERLES) 100 mg capsule    fluticasone (FLONASE) 50 mcg/act nasal spray    DISCONTINUED: azithromycin (ZITHROMAX) 250 mg tablet    DISCONTINUED: benzonatate (TESSALON PERLES) 100 mg capsule    DISCONTINUED: fluticasone (FLONASE) 50 mcg/act nasal spray      2. Acute maxillary sinusitis, recurrence not specified  azithromycin (ZITHROMAX) 250 mg tablet    benzonatate (TESSALON PERLES) 100 mg capsule    fluticasone (FLONASE) 50 mcg/act nasal spray    DISCONTINUED: azithromycin (ZITHROMAX) 250 mg tablet    DISCONTINUED: benzonatate (TESSALON PERLES) 100 mg capsule    DISCONTINUED: fluticasone (FLONASE) 50 mcg/act nasal spray      3. Encounter for laboratory testing for COVID-19 virus  azithromycin (ZITHROMAX) 250 mg tablet    benzonatate (TESSALON PERLES) 100 mg capsule    fluticasone (FLONASE) 50 mcg/act nasal spray    DISCONTINUED: azithromycin (ZITHROMAX) 250 mg tablet    DISCONTINUED: benzonatate (TESSALON PERLES) 100 mg capsule    DISCONTINUED: fluticasone (FLONASE) 50 mcg/act nasal spray      4. Essential hypertension              Patient Instructions       Follow up with PCP in 3-5 days.  Proceed to  ER if symptoms worsen.    Chief Complaint     Chief Complaint   Patient presents with    Cough     Patient reports productive cough, congestion, sinus pressure and pain in facial area. Patient tested positive for FLU A 1 week ago.         History of Present Illness       Pt with 10 days continued nasal congestion sinus pain and pressure sore throat   pt dx with flu 7 days ago     Cough        Review of Systems   Review of Systems    Constitutional: Negative.    HENT:  Positive for congestion, sinus pressure and sinus pain.    Eyes: Negative.    Respiratory:  Positive for cough.    Cardiovascular: Negative.    Gastrointestinal: Negative.    Endocrine: Negative.    Genitourinary: Negative.    Musculoskeletal: Negative.    Skin: Negative.    Allergic/Immunologic: Negative.    Neurological: Negative.    Hematological: Negative.    Psychiatric/Behavioral: Negative.     All other systems reviewed and are negative.        Current Medications       Current Outpatient Medications:     amLODIPine (NORVASC) 5 mg tablet, Take 1 tablet (5 mg total) by mouth in the morning, Disp: 90 tablet, Rfl: 3    atorvastatin (LIPITOR) 20 mg tablet, Take 0.5 tablets (10 mg total) by mouth daily, Disp: 45 tablet, Rfl: 3    azithromycin (ZITHROMAX) 250 mg tablet, Take 2 tablets today then 1 tablet daily x 4 days, Disp: 6 tablet, Rfl: 0    benazepril (LOTENSIN) 20 mg tablet, Take 1 tablet (20 mg total) by mouth daily, Disp: 90 tablet, Rfl: 3    benzonatate (TESSALON PERLES) 100 mg capsule, Take 1 capsule (100 mg total) by mouth 3 (three) times a day as needed for cough, Disp: 20 capsule, Rfl: 0    fluticasone (FLONASE) 50 mcg/act nasal spray, 1 spray into each nostril daily, Disp: 9.9 mL, Rfl: 0    Current Allergies     Allergies as of 02/23/2025    (No Known Allergies)            The following portions of the patient's history were reviewed and updated as appropriate: allergies, current medications, past family history, past medical history, past social history, past surgical history and problem list.     Past Medical History:   Diagnosis Date    Diverticulitis     Hyperlipidemia     Hypertension        Past Surgical History:   Procedure Laterality Date    WISDOM TOOTH EXTRACTION      6 years ag0       No family history on file.      Medications have been verified.        Objective   BP (!) 148/108   Pulse (!) 110   Temp 98.1 °F (36.7 °C)   Resp 18   SpO2 98%         Physical Exam     Physical Exam  Vitals and nursing note reviewed.   Constitutional:       Appearance: Normal appearance. He is normal weight.      Comments: Pt did take extrastrength decongestant    pt will change to coricidin      HENT:      Head: Normocephalic and atraumatic.      Right Ear: Tympanic membrane, ear canal and external ear normal.      Left Ear: Tympanic membrane, ear canal and external ear normal.      Nose: Congestion and rhinorrhea present.      Comments: Boggy mucosa  max sinus tender to palp yellow d/c   Pharyngeal erythema      Mouth/Throat:      Mouth: Mucous membranes are moist.      Pharynx: Oropharynx is clear.   Eyes:      Extraocular Movements: Extraocular movements intact.      Conjunctiva/sclera: Conjunctivae normal.      Pupils: Pupils are equal, round, and reactive to light.   Cardiovascular:      Rate and Rhythm: Normal rate and regular rhythm.      Pulses: Normal pulses.      Heart sounds: Normal heart sounds.   Pulmonary:      Effort: Pulmonary effort is normal.      Comments: Mild coarse sounds cleared with cough   Abdominal:      General: Abdomen is flat. Bowel sounds are normal.      Palpations: Abdomen is soft.   Musculoskeletal:         General: Normal range of motion.      Cervical back: Normal range of motion and neck supple.   Skin:     General: Skin is warm.      Capillary Refill: Capillary refill takes more than 3 seconds.   Neurological:      Mental Status: He is alert and oriented to person, place, and time.   Psychiatric:         Behavior: Behavior normal.

## 2025-06-10 ENCOUNTER — OFFICE VISIT (OUTPATIENT)
Dept: CARDIOLOGY CLINIC | Facility: CLINIC | Age: 50
End: 2025-06-10
Payer: COMMERCIAL

## 2025-06-10 VITALS
DIASTOLIC BLOOD PRESSURE: 98 MMHG | SYSTOLIC BLOOD PRESSURE: 138 MMHG | HEIGHT: 72 IN | WEIGHT: 242 LBS | HEART RATE: 85 BPM | OXYGEN SATURATION: 95 % | BODY MASS INDEX: 32.78 KG/M2

## 2025-06-10 DIAGNOSIS — I10 BENIGN ESSENTIAL HTN: Primary | ICD-10-CM

## 2025-06-10 PROCEDURE — 99214 OFFICE O/P EST MOD 30 MIN: CPT | Performed by: INTERNAL MEDICINE

## 2025-06-10 NOTE — PROGRESS NOTES
Cardiology             Kareem Oneill  1975  [id number]              Assessment/Plan:    Hypertension  Dyslipidemia  Obesity, BMI 33      Patient with subjective orthostasis and an episode of recent lightheadedness/dizziness after drinking alcohol in the hot humidity and wearing longsleeve shirt and pants.  I encouraged adequate hydration during the summer months.  HCTZ has been discontinued in the past.  For now, continue amlodipine and benazepril  Continue atorvastatin for dyslipidemia  Patient is scheduled for sleep study in the near future  Patient counseled on importance of heart healthy diet, exercise, weight loss        Follow-up with PCP from here on        Interval History:     This is a 49-year-old male with hypertension and dyslipidemia who presents today for evaluation.    He does not exercise, and admits to some progressive weight gain over the past few years.  He drinks alcohol infrequently.  He eats out at diner/restaurants/fast foods about 3 times a week.  He does not take any nonsteroidal anti-inflammatory medications.  He states home blood pressure readings are typically in the 120s to 130s.  He admits to loud snoring and daytime hypersomnolence typically later in the day.    He was seen for initial consultation on 1/21/2025 at which time HCTZ was discontinued secondary to subjective orthostasis.    He presents today for follow-up.  He said 1-2 episodes of severe lightheadedness and dizziness recently, when being out in the humidity with longsleeve shirt on after drinking alcohol.         Social History:    Non-smoker          Vitals:  Vitals:    06/10/25 0842   BP: 138/98   Pulse: 85   SpO2: 95%   Weight: 110 kg (242 lb)   Height: 6' (1.829 m)         Past Medical History:   Diagnosis Date   • Diverticulitis    • Hyperlipidemia    • Hypertension      Social History     Socioeconomic History   • Marital status: /Civil Union     Spouse name: Not on file   • Number of children: Not on  file   • Years of education: Not on file   • Highest education level: Not on file   Occupational History   • Not on file   Tobacco Use   • Smoking status: Never   • Smokeless tobacco: Never   Vaping Use   • Vaping status: Never Used   Substance and Sexual Activity   • Alcohol use: Not on file   • Drug use: Not on file   • Sexual activity: Not on file   Other Topics Concern   • Not on file   Social History Narrative   • Not on file     Social Drivers of Health     Financial Resource Strain: Not on file   Food Insecurity: Not on file   Transportation Needs: Not on file   Physical Activity: Not on file   Stress: Not on file   Social Connections: Not on file   Intimate Partner Violence: Not on file   Housing Stability: Not on file      No family history on file.  Past Surgical History:   Procedure Laterality Date   • WISDOM TOOTH EXTRACTION      6 years ag0       Current Outpatient Medications:   •  amLODIPine (NORVASC) 5 mg tablet, Take 1 tablet (5 mg total) by mouth in the morning, Disp: 90 tablet, Rfl: 3  •  atorvastatin (LIPITOR) 20 mg tablet, Take 0.5 tablets (10 mg total) by mouth daily, Disp: 45 tablet, Rfl: 3  •  benazepril (LOTENSIN) 20 mg tablet, Take 1 tablet (20 mg total) by mouth daily, Disp: 90 tablet, Rfl: 3  •  benzonatate (TESSALON PERLES) 100 mg capsule, Take 1 capsule (100 mg total) by mouth 3 (three) times a day as needed for cough (Patient not taking: Reported on 6/10/2025), Disp: 20 capsule, Rfl: 0  •  fluticasone (FLONASE) 50 mcg/act nasal spray, 1 spray into each nostril daily (Patient not taking: Reported on 6/10/2025), Disp: 9.9 mL, Rfl: 0        Review of Systems:  Review of Systems   Respiratory: Negative.     Cardiovascular: Negative.    Neurological:  Positive for light-headedness.   All other systems reviewed and are negative.        Physical Exam:  Physical Exam  Constitutional:       General: He is not in acute distress.     Appearance: He is well-developed. He is obese. He is not  diaphoretic.   HENT:      Head: Normocephalic and atraumatic.     Eyes:      General: No scleral icterus.        Right eye: No discharge.      Pupils: Pupils are equal, round, and reactive to light.     Neck:      Thyroid: No thyromegaly.     Cardiovascular:      Rate and Rhythm: Normal rate and regular rhythm.      Heart sounds: Normal heart sounds. No murmur heard.     No friction rub. No gallop.   Pulmonary:      Effort: Pulmonary effort is normal.      Breath sounds: Normal breath sounds.   Abdominal:      General: There is no distension.      Tenderness: There is no abdominal tenderness. There is no guarding or rebound.     Musculoskeletal:         General: Normal range of motion.      Cervical back: Normal range of motion and neck supple.     Skin:     General: Skin is warm and dry.      Coloration: Skin is not pale.      Findings: No erythema or rash.     Neurological:      Mental Status: He is alert and oriented to person, place, and time.      Coordination: Coordination normal.      Gait: Gait abnormal.     Psychiatric:         Behavior: Behavior normal.         Thought Content: Thought content normal.         Judgment: Judgment normal.         This note was completed in part utilizing [employer] Software.  Grammatical errors, random word insertions, spelling mistakes, and incomplete sentences can be an occasional consequence of this system secondary to software limitations, ambient noise, and hardware issues.  If you have any questions or concerns about the content, text, or information contained within the body of this dictation, please contact the provider for clarification.

## 2025-06-12 ENCOUNTER — OFFICE VISIT (OUTPATIENT)
Dept: SLEEP CENTER | Facility: CLINIC | Age: 50
End: 2025-06-12
Payer: COMMERCIAL

## 2025-06-12 VITALS
HEIGHT: 72 IN | WEIGHT: 242.8 LBS | DIASTOLIC BLOOD PRESSURE: 80 MMHG | SYSTOLIC BLOOD PRESSURE: 134 MMHG | BODY MASS INDEX: 32.89 KG/M2

## 2025-06-12 DIAGNOSIS — G47.39 SLEEP APNEA-LIKE BEHAVIOR: Primary | ICD-10-CM

## 2025-06-12 DIAGNOSIS — R35.1 NOCTURIA: ICD-10-CM

## 2025-06-12 DIAGNOSIS — I10 ESSENTIAL HYPERTENSION: ICD-10-CM

## 2025-06-12 DIAGNOSIS — E66.9 OBESITY WITH BODY MASS INDEX (BMI) OF 30.0 TO 39.9: ICD-10-CM

## 2025-06-12 PROCEDURE — 99245 OFF/OP CONSLTJ NEW/EST HI 55: CPT | Performed by: NURSE PRACTITIONER

## 2025-06-12 NOTE — ASSESSMENT & PLAN NOTE
Hypertension - blood pressure is normal today.  We reviewed the association between untreated obstructive sleep apnea and the increased risk for hypertension. Patient to continue on prescribed anti-hypertensive therapy and follow up with PCP/cardiology for continuity of care.     Orders:    Home Sleep Study; Future

## 2025-06-12 NOTE — PATIENT INSTRUCTIONS
Patient Instructions:     Schedule home sleep study  After testing, the office will call with results and recommendations for plan of treatment     Thank you for trusting me with your care!    I know we often  cover a lot of information at the visit, so if you have follow-up questions, are unclear about the plan, or feel there were important items that we did not discuss or you did not receive clarity on, please don't hesitate to reach out to me.     Z[employer]art messages are preferred for routine matters.  Please make sure to call for urgent matters as there can be a delay in responding to questions over [employer]DIGShart.    IMPORTANT- Prior to a sleep study (at home or in the sleep lab), I strongly recommend contacting your medical insurance first to understand your benefits (including deductible if applicable), coverage for this test, and out of pocket costs.  Even if the test is approved by medical insurance, the cost to you is determined by your medical benefits.   If you have concerns about your out of pocket costs for sleep testing, please contact me/the office before you complete the test and we can discuss if there are alternate options.     I recommend following this advice in general before any lab test, imaging test, doctor visit, surgery, or ordering CPAP supplies as it is best to understand your coverage to avoid unexpected bills after the fact.       Nursing Support:  When: Monday through Friday 7:30A-4:30PM except holidays  Where: Our direct line is [phone]  *3  *1.      If you are having a true emergency please call 911.  In the event that the line is busy or it is after hours please leave a voice message and we will return your call.  Please speak clearly, leaving your full name, birth date, best number to reach you and the reason for your call.   Medication refills: We will need the name of the medication, the dosage, the ordering provider, whether you get a 30 or 90 day refill, and the pharmacy name and  address.  Medications will be ordered by the provider only.  Nurses cannot call in prescriptions.  Please allow 7 days for medication refills.  Physician requested updates: If your provider requested that you call with an update after starting medication, please be ready to provide us the medication and dosage, what time you take your medication, the time you attempt to fall asleep, time you fall asleep, when you wake up, and what time you get out of bed.  Sleep Study Results: We will contact you with sleep study results and/or next steps after the physician has reviewed your testing.

## 2025-06-12 NOTE — ASSESSMENT & PLAN NOTE
49 y.o. y/o who comes in for evaluation of possible sleep apnea  1.  Snoring/sleep disturbances/nocturia -   Body mass index is 32.93 kg/m²., Neck Circumference: 17.  The patient is at risk for obstructive sleep apnea based on history and assessment noted.       -  Check a baseline sleep study to assess for obstructive sleep apnea      -  I discussed in depth the types of diagnostic studies and treatment options involved with obstructive sleep apnea      -  I also discussed in depth the risk of leaving sleep apnea untreated, including hypertension, heart failure, arrhythmia, MI and stroke.      -  The patient is agreeable to undergo testing and treatment of sleep apnea.  The patient understands the pitfalls they may encounter along the way and is willing to attempt treatment with PAP equipment.  The patient would likely do best to start with a full face mask, if PAP therapy is indicated.      Orders:    Home Sleep Study; Future

## 2025-06-12 NOTE — PROGRESS NOTES
Name: Kareem Oneill      : 1975      MRN: [id number]  Encounter Provider: VIRGILIO Tejeda  Encounter Date: 2025   Encounter department: [facility] [place]  :  Assessment & Plan  Sleep apnea-like behavior    49 y.o. y/o who comes in for evaluation of possible sleep apnea  1.  Snoring/sleep disturbances/nocturia -   Body mass index is 32.93 kg/m²., Neck Circumference: 17.  The patient is at risk for obstructive sleep apnea based on history and assessment noted.       -  Check a baseline sleep study to assess for obstructive sleep apnea      -  I discussed in depth the types of diagnostic studies and treatment options involved with obstructive sleep apnea      -  I also discussed in depth the risk of leaving sleep apnea untreated, including hypertension, heart failure, arrhythmia, MI and stroke.      -  The patient is agreeable to undergo testing and treatment of sleep apnea.  The patient understands the pitfalls they may encounter along the way and is willing to attempt treatment with PAP equipment.  The patient would likely do best to start with a full face mask, if PAP therapy is indicated.      Orders:    Home Sleep Study; Future    Essential hypertension  Hypertension - blood pressure is normal today.  We reviewed the association between untreated obstructive sleep apnea and the increased risk for hypertension. Patient to continue on prescribed anti-hypertensive therapy and follow up with PCP/cardiology for continuity of care.     Orders:    Home Sleep Study; Future    Obesity with body mass index (BMI) of 30.0 to 39.9  Obesity - We reviewed the association of obesity on obstructive sleep apnea and sleep disordered breathing. Encouraged patient to follow healthy diet, regular exercise regimen, and pursue weight loss to manage symptoms.     Orders:    Home Sleep Study; Future    Nocturia  Patient reports waking up for urination 2-3 times per night.   Nocturia is common in patients  with sleep apnea.  A home sleep study was ordered today.   Orders:    Home Sleep Study; Future        History of Present Illness   Kareem Oneill is a 49 year old male with PMHx of obesity, HTN, who presents for a consultation regarding concern of possible sleep apnea, as referred by cardiology.  His wife reports loud snoring.  He reports waking up 2-3 times during the night, typically for urination, also tosses and turns during sleep.  He returns to sleep without difficulty unless it is closer to his wake up time.  He feels fairly rested, most days.  He becomes tired in the afternoon.  He does not typically take naps.           Sitting and reading: Would never doze  Watching TV: Would never doze  Sitting, inactive in a public place (e.g. a theatre or a meeting): Would never doze  As a passenger in a car for an hour without a break: Slight chance of dozing  Lying down to rest in the afternoon when circumstances permit: Slight chance of dozing  Sitting and talking to someone: Would never doze  Sitting quietly after a lunch without alcohol: Slight chance of dozing  In a car, while stopped for a few minutes in traffic: Would never doze  Total score: 3     Review of Systems   Respiratory:  Negative for shortness of breath.    Cardiovascular:  Negative for chest pain, palpitations and leg swelling.   Neurological:  Positive for dizziness.        With rapid changes of position   Psychiatric/Behavioral:  Positive for sleep disturbance.      Pertinent positives/negatives included in HPI and also as noted: none      Prior Sleep Studies:    No prior sleep study    Past Treatments:  none      Sleep-Wake Schedule:  He is self-described as a morning person.  Bedtime: 10:30-11:00pm  Wake Time: 6:45am - wakes before the alarm  Difficulty Falling Asleep: No, sometimes returning to sleep, if close to the morning  Avg Number of Awakenings: 2-3 times per night  Cause of Awakenings: for urination  Weekend Sleep Schedule: midnight on  weekends, 7:30am without the use of an alarm  Naps: he does not take naps      Avg TST per 24 hours: 6.5 hours    Sleep-Related Details:  Preferred Sleep Position: supine and side(s)  SDB Symptoms: snoring, multiple awakenings, and dry mouth/nose  Bruxism: No  Nocturnal GERD: Yes, depending on foods he eats or with increased stress  Nocturnal Palpitations: No  Nocturnal Anxiety or Rumination: Yes, at times - situational  Sleep-Related Hallucinations: No   Sleep Paralysis: No   Cataplexy: No     No symptoms consistent with restless legs syndrome    He denies any parasomnia activity.    Wake-Related Details  Daytime Sleepiness: Feels tired in mid afternoon, but able to maintain wakefulness  Drowsy Driving: No  Employment:  currently working full time.  He works 7:30am-3:00pm - M-F, every other Saturday - 8:00-noon  CDL license:  No    Caffeine:  coffee daily in the am, avoids later caffeine    Tobacco:   reports that he has never smoked. He has never used smokeless tobacco.  E-cig/Vaping:    E-Cigarette/Vaping    E-Cigarette Use Never User       E-Cigarette/Vaping Substances    Nicotine No     THC No     CBD No     Flavoring No     Other No     Unknown No       Alcohol:   has no history on file for alcohol use. socially   Drugs:   has no history on file for drug use.    none      Weight Change:   weight has been relatively stable gained 20 lbs over the past 4 years    He does not have difficulty with memory or concentration.         Family History of Sleep Disorders: father had sleep issues        Medical History Reviewed by provider this encounter:  Tobacco  Allergies  Meds  Problems  Med Hx  Surg Hx  Fam Hx     .  Medications Ordered Prior to Encounter[1]   Objective   /80   Ht 6' (1.829 m)   Wt 110 kg (242 lb 12.8 oz)   BMI 32.93 kg/m²     Neck Circumference: 17  Physical Exam  Visit Vitals  /80   Ht 6' (1.829 m)   Wt 110 kg (242 lb 12.8 oz)   BMI 32.93 kg/m²   Smoking Status Never   BSA 2.31  "m²       Constitutional: Alert, cooperative, no distress, appears stated age, obese  Skin: Warm, dry, no rashes noted  Eyes: Conjunctiva/corneas clear  ENT: Nasal congestion absent, nares normal, septum deviated, mucosa normal  Posterior Airspace:   Hart Tongue Position: 4  Retrognathia: absent  Overbite: absent  High Arched Palate: absent  Tongue Scalloping/Ridging: present  Tonsils: 2+  Uvula: enlarged  Lungs: Clear to auscultation bilaterally, respirations unlabored  Heart: normal rate and regular rhythm, S1/2 normal, no murmur noted, no rub or gallop  Extremities: Normal, no digital clubbing, no pedal edema  Neuro: No focal deficits noted      Data  Lab Results   Component Value Date    HGB 14.5 01/21/2023    HCT 43.1 01/21/2023    MCV 87 01/21/2023      Lab Results   Component Value Date    CALCIUM 9.4 02/23/2025    K 3.9 02/23/2025    CO2 27 02/23/2025     02/23/2025    BUN 11 02/23/2025    CREATININE 0.83 02/23/2025     No results found for: \"IRON\", \"TIBC\", \"FERRITIN\"  Lab Results   Component Value Date    AST 38 02/23/2025    ALT 71 (H) 02/23/2025       Administrative Statements   I have spent a total time of 55 minutes in caring for this patient on the day of the visit/encounter including Risks and benefits of tx options, Instructions for management, Patient and family education, Importance of tx compliance, Risk factor reductions, Impressions, Counseling / Coordination of care, Documenting in the medical record, Reviewing/placing orders in the medical record (including tests, medications, and/or procedures), and Obtaining or reviewing history  .         [1]   Current Outpatient Medications on File Prior to Visit   Medication Sig Dispense Refill    amLODIPine (NORVASC) 5 mg tablet Take 1 tablet (5 mg total) by mouth in the morning 90 tablet 3    atorvastatin (LIPITOR) 20 mg tablet Take 0.5 tablets (10 mg total) by mouth daily 45 tablet 3    benazepril (LOTENSIN) 20 mg tablet Take 1 tablet (20 mg " total) by mouth daily 90 tablet 3    benzonatate (TESSALON PERLES) 100 mg capsule Take 1 capsule (100 mg total) by mouth 3 (three) times a day as needed for cough (Patient not taking: Reported on 6/10/2025) 20 capsule 0    fluticasone (FLONASE) 50 mcg/act nasal spray 1 spray into each nostril daily (Patient not taking: Reported on 6/10/2025) 9.9 mL 0     No current facility-administered medications on file prior to visit.

## 2025-06-12 NOTE — ASSESSMENT & PLAN NOTE
Obesity - We reviewed the association of obesity on obstructive sleep apnea and sleep disordered breathing. Encouraged patient to follow healthy diet, regular exercise regimen, and pursue weight loss to manage symptoms.     Orders:    Home Sleep Study; Future

## 2025-06-12 NOTE — ASSESSMENT & PLAN NOTE
Patient reports waking up for urination 2-3 times per night.   Nocturia is common in patients with sleep apnea.  A home sleep study was ordered today.   Orders:    Home Sleep Study; Future

## 2025-06-13 ENCOUNTER — HOSPITAL ENCOUNTER (OUTPATIENT)
Dept: SLEEP CENTER | Facility: CLINIC | Age: 50
Discharge: HOME | End: 2025-06-13
Attending: NURSE PRACTITIONER
Payer: COMMERCIAL

## 2025-06-13 DIAGNOSIS — R35.1 NOCTURIA: ICD-10-CM

## 2025-06-13 DIAGNOSIS — G47.39 SLEEP APNEA-LIKE BEHAVIOR: ICD-10-CM

## 2025-06-13 DIAGNOSIS — I10 ESSENTIAL HYPERTENSION: ICD-10-CM

## 2025-06-13 DIAGNOSIS — E66.9 OBESITY WITH BODY MASS INDEX (BMI) OF 30.0 TO 39.9: ICD-10-CM

## 2025-06-13 PROCEDURE — G0399 HOME SLEEP TEST/TYPE 3 PORTA: HCPCS

## 2025-06-13 NOTE — PROGRESS NOTES
Home Sleep Study Documentation    HOME STUDY DEVICE: Noxturnal no                                           Olinda G3 yes      Pre-Sleep Home Study:    Set-up and instructions performed by: STAN Bruce    Technician performed demonstration for Patient: yes    Return demonstration performed by Patient: yes    Written instructions provided to Patient: yes    Patient signed consent form: yes          Post-Sleep Home Study:    Post Test Questionnaire Data: On the post-study questionnaire, the patient estimated 6 hours of sleep during this test, with 3 awakenings. Please refer to scanned form for additional comments on alcohol use on day of test, medications, and/or activities during awakenings    Additional comments by Patient:      Home Sleep Study Failed:no:    Failure reason: N/A    Reported or Detected: N/A    Scored by: ALFREDO Chandler

## 2025-06-17 PROBLEM — G47.33 OSA (OBSTRUCTIVE SLEEP APNEA): Status: ACTIVE | Noted: 2025-06-17

## 2025-06-17 PROCEDURE — G0399 HOME SLEEP TEST/TYPE 3 PORTA: HCPCS | Performed by: PSYCHIATRY & NEUROLOGY

## 2025-06-23 ENCOUNTER — RESULTS FOLLOW-UP (OUTPATIENT)
Dept: SLEEP CENTER | Facility: CLINIC | Age: 50
End: 2025-06-23

## 2025-06-23 ENCOUNTER — TELEPHONE (OUTPATIENT)
Dept: SLEEP CENTER | Facility: CLINIC | Age: 50
End: 2025-06-23

## 2025-06-23 DIAGNOSIS — I10 ESSENTIAL HYPERTENSION: ICD-10-CM

## 2025-06-23 DIAGNOSIS — G47.33 OSA (OBSTRUCTIVE SLEEP APNEA): Primary | ICD-10-CM

## 2025-06-23 NOTE — TELEPHONE ENCOUNTER
Script and other clinical documents sent to [facility] via [place] with a request to expedite due to severe sleep apnea.

## 2025-06-23 NOTE — TELEPHONE ENCOUNTER
Home sleep study resulted and shows severe sleep apnea with event related desaturations noted. HANNA 75.6. CPAP ordered.     Call to patient reviewed results and recommendations.     [facility] chosen as DME provider.     Compliance appointment scheduled for 10/7/2025 with VIRGILIO Tejeda in the [place] office. Added to wait list to meet compliance. Patient aware to not accept any appointments before he has been using CPAP for 30 days.     See new encounter for script.

## 2025-06-26 LAB
DME PARACHUTE DELIVERY DATE REQUESTED: NORMAL
DME PARACHUTE ITEM DESCRIPTION: NORMAL
DME PARACHUTE ORDER STATUS: NORMAL
DME PARACHUTE SUPPLIER NAME: NORMAL
DME PARACHUTE SUPPLIER PHONE: NORMAL

## 2025-06-30 LAB
